# Patient Record
Sex: MALE | Race: WHITE | NOT HISPANIC OR LATINO | URBAN - METROPOLITAN AREA
[De-identification: names, ages, dates, MRNs, and addresses within clinical notes are randomized per-mention and may not be internally consistent; named-entity substitution may affect disease eponyms.]

---

## 2019-05-31 ENCOUNTER — EMERGENCY (EMERGENCY)
Facility: HOSPITAL | Age: 84
LOS: 1 days | Discharge: HOME | End: 2019-05-31
Attending: EMERGENCY MEDICINE
Payer: MEDICARE

## 2019-05-31 VITALS
HEIGHT: 67 IN | HEART RATE: 76 BPM | WEIGHT: 179.9 LBS | RESPIRATION RATE: 16 BRPM | SYSTOLIC BLOOD PRESSURE: 149 MMHG | OXYGEN SATURATION: 99 % | DIASTOLIC BLOOD PRESSURE: 73 MMHG | TEMPERATURE: 98 F

## 2019-05-31 PROCEDURE — 72125 CT NECK SPINE W/O DYE: CPT

## 2019-05-31 PROCEDURE — 29130 APPL FINGER SPLINT STATIC: CPT | Mod: FA

## 2019-05-31 PROCEDURE — 29130 APPL FINGER SPLINT STATIC: CPT

## 2019-05-31 PROCEDURE — 90715 TDAP VACCINE 7 YRS/> IM: CPT

## 2019-05-31 PROCEDURE — 73140 X-RAY EXAM OF FINGER(S): CPT | Mod: 26,LT

## 2019-05-31 PROCEDURE — 90471 IMMUNIZATION ADMIN: CPT

## 2019-05-31 PROCEDURE — 99284 EMERGENCY DEPT VISIT MOD MDM: CPT | Mod: 25

## 2019-05-31 PROCEDURE — 73140 X-RAY EXAM OF FINGER(S): CPT

## 2019-05-31 PROCEDURE — 72125 CT NECK SPINE W/O DYE: CPT | Mod: 26

## 2019-05-31 PROCEDURE — 12002 RPR S/N/AX/GEN/TRNK2.6-7.5CM: CPT | Mod: 59

## 2019-05-31 PROCEDURE — 70450 CT HEAD/BRAIN W/O DYE: CPT

## 2019-05-31 PROCEDURE — 70450 CT HEAD/BRAIN W/O DYE: CPT | Mod: 26

## 2019-05-31 PROCEDURE — 12002 RPR S/N/AX/GEN/TRNK2.6-7.5CM: CPT

## 2019-05-31 RX ORDER — TETANUS TOXOID, REDUCED DIPHTHERIA TOXOID AND ACELLULAR PERTUSSIS VACCINE, ADSORBED 5; 2.5; 8; 8; 2.5 [IU]/.5ML; [IU]/.5ML; UG/.5ML; UG/.5ML; UG/.5ML
0.5 SUSPENSION INTRAMUSCULAR ONCE
Refills: 0 | Status: COMPLETED | OUTPATIENT
Start: 2019-05-31 | End: 2019-05-31

## 2019-05-31 RX ADMIN — TETANUS TOXOID, REDUCED DIPHTHERIA TOXOID AND ACELLULAR PERTUSSIS VACCINE, ADSORBED 0.5 MILLILITER(S): 5; 2.5; 8; 8; 2.5 SUSPENSION INTRAMUSCULAR at 23:07

## 2019-05-31 NOTE — ED PROVIDER NOTE - CARE PLAN
Principal Discharge DX:	Scalp laceration, initial encounter  Secondary Diagnosis:	Finger fracture, left

## 2019-05-31 NOTE — ED PROVIDER NOTE - MUSCULOSKELETAL, MLM
Spine appears normal, range of motion is not limited, no muscle or joint tenderness.  mild ttp at left prox phalanx, ambulating

## 2019-05-31 NOTE — ED PROVIDER NOTE - CLINICAL SUMMARY MEDICAL DECISION MAKING FREE TEXT BOX
83 yr old male with hx of mild dementia and hypothyroidism not on ac presents to ed c/o mechanical trip and fall around 8p.  at home walking towards kitchen and floor with an uneven surface tripped broke fall with left hand but struck right side of head against the floor.  no loc, no cp, no palpitations, no visual changes. right forehead with laceration and bleeding. c/o left thumb pain.  otherwise no vomiting, no change in mental state- witness by daughter    mechanical fall r/o ich- ct head and cervical.  tetanus, repair laceration of scalp.

## 2019-05-31 NOTE — ED PROVIDER NOTE - PROGRESS NOTE DETAILS
[place]: xr of thumb shows- proximal phalanx fracture into articular surface, comminuted. - splint placed.  f/u with hand surgeon. ct head and cervical no acute findings.  placed nylon 6.0 single interrrupted and 2 running.  bleeding controlled.  bacitracin to area BID, keep area dry/clean.  monitor for infection (redness/pus,worsening pain, loss of mobility with increase swelling).  ok to wash after 1 day running soap and water.  wound check in 3 days and suture will be remove in 5 days    dx mechanical fall thumb phalanx fx s/p splint.  scalp suture place. and will be removed in 5 days.  f/u with pcp and hand surgeon. left ambulatory.  return precautions given.

## 2019-05-31 NOTE — ED PROVIDER NOTE - OBJECTIVE STATEMENT
83 yr old male with hx of mild dementia and hypothyroidism not on ac presents to ed c/o mechanical trip and fall around 8p.  at home walking towards kitchen and floor with an uneven surface tripped broke fall with left hand but struck right side of head against the floor.  no loc, no cp, no palpitations, no visual changes. right forehead with laceration and bleeding. c/o left thumb pain.  otherwise no vomiting, no change in mental state- witness by daughter

## 2019-06-01 NOTE — ED ADULT NURSE NOTE - NSIMPLEMENTINTERV_GEN_ALL_ED
Implemented All Universal Safety Interventions:  [place] to call system. Call bell, personal items and telephone within reach. Instruct patient to call for assistance. Room bathroom lighting operational. Non-slip footwear when patient is off stretcher. Physically safe environment: no spills, clutter or unnecessary equipment. Stretcher in lowest position, wheels locked, appropriate side rails in place.

## 2019-06-01 NOTE — ED PROCEDURE NOTE - PROCEDURE ADDITIONAL DETAILS
Apply bacitracin to area 2x a day. No shower for first 24 hours. Wound check in 3 days, removal in 5-7 days.

## 2019-06-01 NOTE — ED PROCEDURE NOTE - CPROC ED POST PROC CARE GUIDE1
Instructed patient/caregiver to follow-up with primary care physician./Verbal/written post procedure instructions were given to patient/caregiver. Instructed patient/caregiver to follow-up with primary care physician./Verbal/written post procedure instructions were given to patient/caregiver./Apply bacitracin to area 2x a day. No shower for first 24 hours. Wound check in 3 days, removal in 5-7 days.

## 2021-01-31 ENCOUNTER — EMERGENCY (EMERGENCY)
Facility: HOSPITAL | Age: 86
LOS: 1 days | Discharge: HOME | End: 2021-01-31
Attending: EMERGENCY MEDICINE
Payer: MEDICARE

## 2021-01-31 VITALS
HEART RATE: 81 BPM | OXYGEN SATURATION: 95 % | RESPIRATION RATE: 17 BRPM | TEMPERATURE: 97 F | SYSTOLIC BLOOD PRESSURE: 136 MMHG | DIASTOLIC BLOOD PRESSURE: 94 MMHG | HEIGHT: 67 IN

## 2021-01-31 VITALS
OXYGEN SATURATION: 97 % | HEART RATE: 76 BPM | DIASTOLIC BLOOD PRESSURE: 58 MMHG | SYSTOLIC BLOOD PRESSURE: 129 MMHG | RESPIRATION RATE: 18 BRPM

## 2021-01-31 PROCEDURE — 72125 CT NECK SPINE W/O DYE: CPT

## 2021-01-31 PROCEDURE — 72125 CT NECK SPINE W/O DYE: CPT | Mod: 26

## 2021-01-31 PROCEDURE — 99284 EMERGENCY DEPT VISIT MOD MDM: CPT

## 2021-01-31 PROCEDURE — 99284 EMERGENCY DEPT VISIT MOD MDM: CPT | Mod: 25

## 2021-01-31 PROCEDURE — 70450 CT HEAD/BRAIN W/O DYE: CPT | Mod: 26

## 2021-01-31 PROCEDURE — 70450 CT HEAD/BRAIN W/O DYE: CPT | Mod: 26,77

## 2021-01-31 PROCEDURE — 70450 CT HEAD/BRAIN W/O DYE: CPT

## 2021-01-31 RX ORDER — MEMANTINE HYDROCHLORIDE 10 MG/1
1 TABLET ORAL
Qty: 0 | Refills: 0 | DISCHARGE

## 2021-01-31 RX ORDER — ESCITALOPRAM OXALATE 10 MG/1
15 TABLET, FILM COATED ORAL
Qty: 0 | Refills: 0 | DISCHARGE

## 2021-01-31 NOTE — ED ADULT NURSE NOTE - OBJECTIVE STATEMENT
BIba s/p slipped and fell at home noted with hematoma BIba s/p slipped and fell at home noted with hematoma on  r parietal area, denies loc

## 2021-01-31 NOTE — ED PROVIDER NOTE - OBJECTIVE STATEMENT
Per wife, patient slipped and fell. She heard a crash, turned around, saw patient lying on the floor, awake. Wife reports patient refuses to wear slippers or use a cane, insists on wearing socks. No blood thinners. ROS limited 2/2 dementia.

## 2021-01-31 NOTE — ED PROVIDER NOTE - PATIENT PORTAL LINK FT
You can access the FollowMyHealth Patient Portal offered by [facility] by registering at the following website: [email or web address]. By joining [employer]’s FollowMyHealth portal, you will also be able to view your health information using other applications (apps) compatible with our system.

## 2021-01-31 NOTE — ED PROVIDER NOTE - NSFOLLOWUPINSTRUCTIONS_ED_ALL_ED_FT
Head Injury, Adult    There are many types of head injuries. They can be as minor as a bump. Some head injuries can be worse. Worse injuries include:  •A strong hit to the head that shakes the brain back and forth causing damage (concussion).      •A bruise (contusion) of the brain. This means there is bleeding in the brain that can cause swelling.      •A cracked skull (skull fracture).      •Bleeding in the brain that gathers, gets thick (makes a clot), and forms a bump (hematoma).      Most problems from a head injury come in the first 24 hours. However, you may still have side effects up to 7–10 days after your injury. It is important to watch your condition for any changes. You may need to be watched in the emergency department or urgent care, or you may need to stay in the hospital.      What are the causes?  There are many possible causes of a head injury. A serious head injury may be caused by:  •A car accident.      •Bicycle or motorcycle accidents.      •Sports injuries.      •Falls.        What are the signs or symptoms?  Symptoms of a head injury include a bruise, bump, or bleeding where the injury happened. Other physical symptoms may include:  •Headache.      •Feeling sick to your stomach (nauseous) or vomiting.      •Dizziness.      •Feeling tired.      •Being uncomfortable around bright lights or loud noises.      •Shaking movements that you cannot control (seizures).      •Trouble being woken up.      •Passing out (fainting).    Mental or emotional symptoms may include:  •Feeling grumpy or cranky.      •Confusion and memory problems.      •Having trouble paying attention or concentrating.      •Changes in eating or sleeping habits.      •Feeling worried or nervous (anxious).      •Feeling sad (depressed).        How is this treated?    Treatment for this condition depends on how severe the injury is and the type of injury you have. The main goal is to prevent complications and to allow the brain time to heal.    Mild head injury   If you have a mild head injury, you may be sent home and treatment may include:  •Being watched. A responsible adult should stay with you for 24 hours after your injury and check on you often.      •Physical rest.      •Brain rest.      •Pain medicines.      Severe head injury  If you have a severe head injury, treatment may include:  •Being watched closely. This includes hospitalization with frequent physical exams.    •Medicines to:  •Help with pain.      •Prevent shaking movements that you cannot control.      •Help with brain swelling.        •Using a machine that helps you breathe (ventilator).      •Treatments to manage the swelling inside the brain.    •Brain surgery. This may be needed to:  •Remove a blood clot.      •Stop the bleeding.      •Remove a part of the skull. This allows room for the brain to swell.          Follow these instructions at home:    Activity     •Rest.      •Avoid activities that are hard or tiring.      •Make sure you get enough sleep.    •Limit activities that need a lot of thought or attention, such as:  •Watching TV.      •Playing memory games and puzzles.      •Job-related work or homework.      •Working on the computer, social media, and texting.        •Avoid activities that could cause another head injury until your doctor says it is okay. This includes playing sports. Having another head injury, especially before the first one has healed, can be dangerous.      •Ask your doctor when it is safe for you to go back to your normal activities, such as work or school. Ask your doctor for a step-by-step plan for slowly going back to your normal activities.      •Ask your doctor when you can drive, ride a bicycle, or use heavy machinery. Do not do these activities if you are dizzy.        Lifestyle      • Do not drink alcohol until your doctor says it is okay.      • Do not use drugs.      •If it is harder than usual to remember things, write them down.      •If you are easily distracted, try to do one thing at a time.      •Talk with family members or close friends when making important decisions.      •Tell your friends, family, a trusted coworker, and  about your injury, symptoms, and limits (restrictions). Have them watch for any problems that are new or getting worse.      General instructions     •Take over-the-counter and prescription medicines only as told by your doctor.      •Have someone stay with you for 24 hours after your head injury. This person should watch you for any changes in your symptoms and be ready to get help.      •Keep all follow-up visits as told by your doctor. This is important.      How is this prevented?     •Work on your balance and strength. This can help you avoid falls.      •Wear a seatbelt when you are in a moving vehicle.    •Wear a helmet when you:  •Ride a bicycle.      •Ski.      •Do any other sport or activity that has a risk of injury.      •If you drink alcohol:•Limit how much you use to:  •0–1 drink a day for women.      •0–2 drinks a day for men.        •Be aware of how much alcohol is in your drink. In the [place] one drink equals one 12 oz bottle of beer (355 mL), one 5 oz glass of wine (148 mL), or one 1½ oz glass of hard liquor (44 mL).      •Make your home safer by:  •Getting rid of clutter from the floors and stairs. This includes things that can make you trip.      •Using grab bars in bathrooms and handrails by stairs.      •Placing non-slip mats on floors and in bathtubs.      •Putting more light in dim areas.          Get help right away if:  •You have:  •A very bad headache that is not helped by medicine.      •Trouble walking or weakness in your arms and legs.      •Clear or bloody fluid coming from your nose or ears.      •Changes in how you see (vision).      •Shaking movements that you cannot control.        •You lose your balance.      •You vomit.      •The black centers of your eyes (pupils) change in size.      •Your speech is slurred.      •Your dizziness gets worse.      •You pass out.      •You are sleepier than normal and have trouble staying awake.      •Your symptoms get worse.      These symptoms may be an emergency. Do not wait to see if the symptoms will go away. Get medical help right away. Call your local emergency services (911 in the [place] Do not drive yourself to the hospital.       Summary    •There are many types of head injuries. They can be as minor as a bump. Some head injuries can be worse      •Treatment for this condition depends on how severe the injury is and the type of injury you have.      •Ask your doctor when it is safe for you to go back to your normal activities, such as work or school.      •To prevent a head injury, wear a seat belt in a car, wear a helmet when you use a a bicycle, limit your alcohol use, and make your home safer.      This information is not intended to replace advice given to you by your health care provider. Make sure you discuss any questions you have with your health care provider.

## 2021-01-31 NOTE — ED PROVIDER NOTE - ENMT, MLM
Airway patent, Nasal mucosa clear. Mouth with normal mucosa. Throat has no vesicles, no oropharyngeal exudates and uvula is midline. Head: normocephalic. Large hematoma to right parietal area

## 2021-01-31 NOTE — ED PROVIDER NOTE - NSCAREINITIATED _GEN_ER
Admission assessment completed. Dr. La Lopez called to get orders. Dual skin assessment completed. 1106: Dr. La Lopez called again for orders. Patient states that her pain is increasing. hWitley Urias(Attending)

## 2021-01-31 NOTE — ED PROVIDER NOTE - PROGRESS NOTE DETAILS
Wife reports patient has become deconditioned during home confinement due to COVID pandemic. Asked about treatments. I discussed admission for referral to rehab center. Wife declined. She will talk to PMD about outpatient PT. Called by daughter Suzanne Fofana [phone]. Updated her on patient's condition. Spoke with Dr. Earl from [facility] neurosurgery. He reviewed images. Does not see hemorrhage. Recommends repeat scan at 6hours. If normal, may d/c. Called transfer center. Patient is resting comfortably, NAD. Endorsed to Dr. Deutsch. Awaiting repeat CTH Spoke with pt daughter Ms. Fofana, discussed repeat CT head findings. Family will arrive to pickup pt within the hour. Answered q's.

## 2021-01-31 NOTE — ED ADULT NURSE REASSESSMENT NOTE - NS ED NURSE REASSESS COMMENT FT1
Pt resting in bed, A&OX3 with forgetfulness. Pt awaiting dispo. No acute distress noted, denies chest pain, no shortness of breath indicated.

## 2021-05-11 PROBLEM — F03.90 UNSPECIFIED DEMENTIA, UNSPECIFIED SEVERITY, WITHOUT BEHAVIORAL DISTURBANCE, PSYCHOTIC DISTURBANCE, MOOD DISTURBANCE, AND ANXIETY: Chronic | Status: ACTIVE | Noted: 2021-01-31

## 2021-05-21 ENCOUNTER — APPOINTMENT (OUTPATIENT)
Dept: GASTROENTEROLOGY | Facility: CLINIC | Age: 86
End: 2021-05-21
Payer: MEDICARE

## 2021-05-21 VITALS
SYSTOLIC BLOOD PRESSURE: 110 MMHG | WEIGHT: 189 LBS | BODY MASS INDEX: 29.66 KG/M2 | HEIGHT: 67 IN | DIASTOLIC BLOOD PRESSURE: 80 MMHG | TEMPERATURE: 96.9 F

## 2021-05-21 DIAGNOSIS — R13.12 DYSPHAGIA, OROPHARYNGEAL PHASE: ICD-10-CM

## 2021-05-21 PROBLEM — Z00.00 ENCOUNTER FOR PREVENTIVE HEALTH EXAMINATION: Status: ACTIVE | Noted: 2021-05-21

## 2021-05-21 PROCEDURE — 99213 OFFICE O/P EST LOW 20 MIN: CPT

## 2021-05-21 RX ORDER — YOHIMBE BARK 500 MG
CAPSULE ORAL
Refills: 0 | Status: ACTIVE | COMMUNITY

## 2021-05-21 RX ORDER — ESCITALOPRAM OXALATE 5 MG/1
5 TABLET, FILM COATED ORAL
Refills: 0 | Status: ACTIVE | COMMUNITY

## 2021-05-21 RX ORDER — GALANTAMINE 8 MG/1
8 TABLET, FILM COATED ORAL
Refills: 0 | Status: ACTIVE | COMMUNITY

## 2021-05-21 RX ORDER — MV-MIN/FOLIC/VIT K/LYCOP/COQ10 200-100MCG
CAPSULE ORAL
Refills: 0 | Status: ACTIVE | COMMUNITY

## 2021-05-21 RX ORDER — MEMANTINE HYDROCHLORIDE 5 MG/1
5 TABLET, FILM COATED ORAL
Refills: 0 | Status: ACTIVE | COMMUNITY

## 2021-05-21 RX ORDER — ESCITALOPRAM OXALATE 10 MG/1
10 TABLET, FILM COATED ORAL
Refills: 0 | Status: ACTIVE | COMMUNITY

## 2021-05-21 RX ORDER — FLUDROCORTISONE ACETATE 0.1 MG/1
0.1 TABLET ORAL
Refills: 0 | Status: ACTIVE | COMMUNITY

## 2021-05-21 RX ORDER — ASPIRIN 81 MG/1
81 TABLET, COATED ORAL
Refills: 0 | Status: ACTIVE | COMMUNITY

## 2021-05-21 RX ORDER — ASCORBIC ACID 500 MG
TABLET ORAL
Refills: 0 | Status: ACTIVE | COMMUNITY

## 2021-05-21 RX ORDER — LEVOTHYROXINE SODIUM 137 UG/1
TABLET ORAL
Refills: 0 | Status: ACTIVE | COMMUNITY

## 2021-05-21 NOTE — ASSESSMENT
[FreeTextEntry1] : Mr. Canchola is an 85-year-old man with dementia.  The majority of the history was obtained from his wife who is present.  She notices postprandial coughing and choking at times there has been no episodes of pneumonia.  Is not been any unexplained weight loss.  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  Is partially due to his dementia since the patient is not chewing well due to recent problems with dentition and the fact that he does not drink sips of water when he is eating.  It is unlikely that he has esophageal obstructive pathology but instead I suspect that it is probably oropharyngeal in nature.  He may be having transfer issues causing his symptoms.  The patient will go for repeat esophagram, depending on this result we will decide whether there is need for an endoscopy.  If there is motility, especially oropharyngeal he may benefit from speech and swallow therapy if he is able to cooperate with the instructions.  I will get back to the wife once the esophagram is reviewed.  Patient will continue his current dose of a proton pump inhibitor.

## 2021-05-21 NOTE — REVIEW OF SYSTEMS
[Negative] : Respiratory [FreeTextEntry7] : Occasional postprandial cough with no regurgitation of food impaction.

## 2021-06-16 ENCOUNTER — APPOINTMENT (OUTPATIENT)
Dept: RADIOLOGY | Facility: HOSPITAL | Age: 86
End: 2021-06-16
Payer: MEDICARE

## 2021-06-16 ENCOUNTER — OUTPATIENT (OUTPATIENT)
Dept: OUTPATIENT SERVICES | Facility: HOSPITAL | Age: 86
LOS: 1 days | End: 2021-06-16
Payer: MEDICARE

## 2021-06-16 DIAGNOSIS — R13.12 DYSPHAGIA, OROPHARYNGEAL PHASE: ICD-10-CM

## 2021-06-16 PROCEDURE — 74220 X-RAY XM ESOPHAGUS 1CNTRST: CPT | Mod: 26

## 2021-06-16 PROCEDURE — 74220 X-RAY XM ESOPHAGUS 1CNTRST: CPT

## 2022-11-04 ENCOUNTER — RX RENEWAL (OUTPATIENT)
Age: 87
End: 2022-11-04

## 2022-11-04 RX ORDER — OMEPRAZOLE 40 MG/1
40 CAPSULE, DELAYED RELEASE ORAL
Qty: 90 | Refills: 3 | Status: ACTIVE | COMMUNITY
Start: 2022-11-04 | End: 1900-01-01